# Patient Record
Sex: MALE | Race: WHITE
[De-identification: names, ages, dates, MRNs, and addresses within clinical notes are randomized per-mention and may not be internally consistent; named-entity substitution may affect disease eponyms.]

---

## 2021-10-10 ENCOUNTER — HOSPITAL ENCOUNTER (EMERGENCY)
Dept: HOSPITAL 79 - ER1 | Age: 76
Discharge: HOME | End: 2021-10-10
Payer: MEDICARE

## 2021-10-10 DIAGNOSIS — I11.0: ICD-10-CM

## 2021-10-10 DIAGNOSIS — I50.9: ICD-10-CM

## 2021-10-10 DIAGNOSIS — J44.1: Primary | ICD-10-CM

## 2021-10-10 DIAGNOSIS — F17.210: ICD-10-CM

## 2021-10-10 DIAGNOSIS — E78.5: ICD-10-CM

## 2021-10-10 DIAGNOSIS — Z20.822: ICD-10-CM

## 2021-10-10 DIAGNOSIS — Z90.49: ICD-10-CM

## 2021-10-10 DIAGNOSIS — Z95.1: ICD-10-CM

## 2021-10-10 LAB
BUN/CREATININE RATIO: 19 (ref 0–10)
HGB BLD-MCNC: 14.2 GM/DL (ref 14–17.5)
RED BLOOD COUNT: 4.53 M/UL (ref 4.2–5.5)
WHITE BLOOD COUNT: 10.8 K/UL (ref 4.5–11)

## 2021-10-10 PROCEDURE — U0002 COVID-19 LAB TEST NON-CDC: HCPCS

## 2022-02-01 ENCOUNTER — HOSPITAL ENCOUNTER (INPATIENT)
Dept: HOSPITAL 79 - ER1 | Age: 77
LOS: 3 days | Discharge: LEFT BEFORE DISCHARGE | DRG: 291 | End: 2022-02-04
Attending: INTERNAL MEDICINE | Admitting: INTERNAL MEDICINE
Payer: MEDICARE

## 2022-02-01 VITALS — BODY MASS INDEX: 27.09 KG/M2 | HEIGHT: 72 IN | WEIGHT: 200 LBS

## 2022-02-01 DIAGNOSIS — D50.9: ICD-10-CM

## 2022-02-01 DIAGNOSIS — J96.22: ICD-10-CM

## 2022-02-01 DIAGNOSIS — J44.9: ICD-10-CM

## 2022-02-01 DIAGNOSIS — Z87.11: ICD-10-CM

## 2022-02-01 DIAGNOSIS — E78.5: ICD-10-CM

## 2022-02-01 DIAGNOSIS — Z20.822: ICD-10-CM

## 2022-02-01 DIAGNOSIS — J15.9: ICD-10-CM

## 2022-02-01 DIAGNOSIS — Z86.718: ICD-10-CM

## 2022-02-01 DIAGNOSIS — Z82.49: ICD-10-CM

## 2022-02-01 DIAGNOSIS — I42.9: ICD-10-CM

## 2022-02-01 DIAGNOSIS — J96.21: ICD-10-CM

## 2022-02-01 DIAGNOSIS — Z90.49: ICD-10-CM

## 2022-02-01 DIAGNOSIS — Z95.5: ICD-10-CM

## 2022-02-01 DIAGNOSIS — Z95.1: ICD-10-CM

## 2022-02-01 DIAGNOSIS — I11.0: Primary | ICD-10-CM

## 2022-02-01 DIAGNOSIS — I50.23: ICD-10-CM

## 2022-02-01 DIAGNOSIS — E11.9: ICD-10-CM

## 2022-02-01 DIAGNOSIS — I48.91: ICD-10-CM

## 2022-02-01 DIAGNOSIS — Z79.01: ICD-10-CM

## 2022-02-01 DIAGNOSIS — F17.290: ICD-10-CM

## 2022-02-01 DIAGNOSIS — I47.1: ICD-10-CM

## 2022-02-01 DIAGNOSIS — I71.4: ICD-10-CM

## 2022-02-01 LAB
BUN/CREATININE RATIO: 23 (ref 0–10)
HGB BLD-MCNC: 16.2 GM/DL (ref 14–17.5)
RED BLOOD COUNT: 5.18 M/UL (ref 4.2–5.5)
WHITE BLOOD COUNT: 12.9 K/UL (ref 4.5–11)

## 2022-02-01 PROCEDURE — P9047 ALBUMIN (HUMAN), 25%, 50ML: HCPCS

## 2022-02-01 PROCEDURE — U0002 COVID-19 LAB TEST NON-CDC: HCPCS

## 2022-02-02 LAB
BUN/CREATININE RATIO: 27 (ref 0–10)
HGB BLD-MCNC: 14.5 GM/DL (ref 14–17.5)
RED BLOOD COUNT: 4.88 M/UL (ref 4.2–5.5)
WHITE BLOOD COUNT: 16.2 K/UL (ref 4.5–11)

## 2022-02-02 PROCEDURE — B24BZZZ ULTRASONOGRAPHY OF HEART WITH AORTA: ICD-10-PCS | Performed by: STUDENT IN AN ORGANIZED HEALTH CARE EDUCATION/TRAINING PROGRAM

## 2022-02-03 LAB
BUN/CREATININE RATIO: 32 (ref 0–10)
HGB BLD-MCNC: 13.3 GM/DL (ref 14–17.5)
RED BLOOD COUNT: 4.43 M/UL (ref 4.2–5.5)
WHITE BLOOD COUNT: 14.7 K/UL (ref 4.5–11)

## 2022-02-03 NOTE — NUR
PATIENT STATED NITROGLYCERIN WAS EFFECTIVE IN REDUCING CHEST PAIN/DISCOMFORT.
PATIENT STATED HE DIDN'T NEED ANOTHER TABLET.

## 2022-02-04 LAB
BUN/CREATININE RATIO: 33 (ref 0–10)
HGB BLD-MCNC: 12.7 GM/DL (ref 14–17.5)
RED BLOOD COUNT: 4.35 M/UL (ref 4.2–5.5)
WHITE BLOOD COUNT: 15.7 K/UL (ref 4.5–11)

## 2022-02-04 NOTE — NUR
1000- PATIENT STATED HE WANTED TO SPEAK WITH DR HARTMANN ABOUT GOING HOME TODAY.
DR HARTMANN IN ROOM AT THIS TIME. STATED PATIENT WOULD HAVE TO SIGN OUT AMA IF HE
WANTED TO DISCHARGE TODAY. PATIENT STATED HE WOULD SIGN OUT AMA. MD CESAR.

## 2022-02-04 NOTE — NUR
PATIENT LEAVING AMA AT THIS TIME. DISCHARGE INSTRUCTIONS GIVEN TO PATIENT AND
EDUCATED ON LEAVING AMA. MD AWARE. PATIENT VERBALIZED UNDERSTANDING.

## 2022-03-21 ENCOUNTER — HOSPITAL ENCOUNTER (INPATIENT)
Dept: HOSPITAL 79 - ER1 | Age: 77
LOS: 3 days | Discharge: ANOTHER HOSPITAL | DRG: 175 | End: 2022-03-24
Attending: INTERNAL MEDICINE | Admitting: INTERNAL MEDICINE
Payer: MEDICARE

## 2022-03-21 VITALS — HEIGHT: 74 IN | BODY MASS INDEX: 28.27 KG/M2 | WEIGHT: 220.25 LBS

## 2022-03-21 DIAGNOSIS — Z20.822: ICD-10-CM

## 2022-03-21 DIAGNOSIS — Z79.4: ICD-10-CM

## 2022-03-21 DIAGNOSIS — J44.1: ICD-10-CM

## 2022-03-21 DIAGNOSIS — I13.0: ICD-10-CM

## 2022-03-21 DIAGNOSIS — E87.2: ICD-10-CM

## 2022-03-21 DIAGNOSIS — Z87.11: ICD-10-CM

## 2022-03-21 DIAGNOSIS — Z95.1: ICD-10-CM

## 2022-03-21 DIAGNOSIS — Z90.49: ICD-10-CM

## 2022-03-21 DIAGNOSIS — Z95.5: ICD-10-CM

## 2022-03-21 DIAGNOSIS — R65.10: ICD-10-CM

## 2022-03-21 DIAGNOSIS — E11.22: ICD-10-CM

## 2022-03-21 DIAGNOSIS — F17.210: ICD-10-CM

## 2022-03-21 DIAGNOSIS — K21.9: ICD-10-CM

## 2022-03-21 DIAGNOSIS — Z86.718: ICD-10-CM

## 2022-03-21 DIAGNOSIS — Z79.01: ICD-10-CM

## 2022-03-21 DIAGNOSIS — I26.99: Primary | ICD-10-CM

## 2022-03-21 DIAGNOSIS — J18.9: ICD-10-CM

## 2022-03-21 DIAGNOSIS — N17.9: ICD-10-CM

## 2022-03-21 DIAGNOSIS — I71.4: ICD-10-CM

## 2022-03-21 DIAGNOSIS — I77.89: ICD-10-CM

## 2022-03-21 DIAGNOSIS — I50.23: ICD-10-CM

## 2022-03-21 DIAGNOSIS — E87.1: ICD-10-CM

## 2022-03-21 DIAGNOSIS — I48.0: ICD-10-CM

## 2022-03-21 DIAGNOSIS — J96.21: ICD-10-CM

## 2022-03-21 DIAGNOSIS — J44.0: ICD-10-CM

## 2022-03-21 DIAGNOSIS — Z82.49: ICD-10-CM

## 2022-03-21 LAB
BUN/CREATININE RATIO: 56 (ref 0–10)
HGB BLD-MCNC: 12.7 GM/DL (ref 14–17.5)
RED BLOOD COUNT: 4.17 M/UL (ref 4.2–5.5)
WHITE BLOOD COUNT: 20.3 K/UL (ref 4.5–11)

## 2022-03-21 PROCEDURE — U0002 COVID-19 LAB TEST NON-CDC: HCPCS

## 2022-03-22 LAB
BUN/CREATININE RATIO: 57 (ref 0–10)
HGB BLD-MCNC: 13.1 GM/DL (ref 14–17.5)
RED BLOOD COUNT: 4.5 M/UL (ref 4.2–5.5)
WHITE BLOOD COUNT: 20.3 K/UL (ref 4.5–11)

## 2022-03-23 LAB
HGB BLD-MCNC: 11.5 GM/DL (ref 14–17.5)
RED BLOOD COUNT: 3.84 M/UL (ref 4.2–5.5)
WHITE BLOOD COUNT: 10.6 K/UL (ref 4.5–11)

## 2022-03-23 NOTE — NUR
Patient's left foot noted to be purple while right foot was noted as pink with
good pulse
Doppler obtained no left foot pulse noted
On call hospitalist called, x2 no answer
Dayshift hospitalist attempted to call x1 no answer
[facility]
Pt going to [place] for PE protocol at this time....

## 2022-04-03 ENCOUNTER — HOSPITAL ENCOUNTER (EMERGENCY)
Dept: HOSPITAL 79 - ER1 | Age: 77
Discharge: OTHER INSTITUTION | End: 2022-04-03
Payer: MEDICARE

## 2022-04-03 DIAGNOSIS — J18.9: ICD-10-CM

## 2022-04-03 DIAGNOSIS — R04.0: ICD-10-CM

## 2022-04-03 DIAGNOSIS — K62.5: Primary | ICD-10-CM

## 2022-04-03 LAB
BUN/CREATININE RATIO: 23 (ref 0–10)
HGB BLD-MCNC: 8.3 GM/DL (ref 14–17.5)
RED BLOOD COUNT: 2.78 M/UL (ref 4.2–5.5)
WHITE BLOOD COUNT: 10 K/UL (ref 4.5–11)

## 2022-04-03 PROCEDURE — P9016 RBC LEUKOCYTES REDUCED: HCPCS
